# Patient Record
Sex: FEMALE | Race: WHITE | Employment: UNEMPLOYED | URBAN - METROPOLITAN AREA
[De-identification: names, ages, dates, MRNs, and addresses within clinical notes are randomized per-mention and may not be internally consistent; named-entity substitution may affect disease eponyms.]

---

## 2017-07-01 PROBLEM — M00.9 PYOGENIC ARTHRITIS OF RIGHT KNEE JOINT (HCC): Status: ACTIVE | Noted: 2017-07-01

## 2017-07-03 PROBLEM — R78.81 BACTEREMIA DUE TO PSEUDOMONAS: Status: ACTIVE | Noted: 2017-07-03

## 2017-07-03 PROBLEM — B96.5 BACTEREMIA DUE TO PSEUDOMONAS: Status: ACTIVE | Noted: 2017-07-03
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
## 2017-10-07 PROBLEM — N17.9 ACUTE RENAL FAILURE (ARF) (HCC): Status: ACTIVE | Noted: 2017-10-07

## 2017-10-09 PROBLEM — R78.81 MRSA BACTEREMIA: Status: ACTIVE | Noted: 2017-10-09

## 2017-10-09 PROBLEM — B95.62 MRSA BACTEREMIA: Status: ACTIVE | Noted: 2017-10-09

## 2017-10-18 PROBLEM — E43 SEVERE MALNUTRITION (HCC): Status: ACTIVE | Noted: 2017-10-18

## 2017-11-02 ENCOUNTER — TELEPHONE (OUTPATIENT)
Dept: INFECTIOUS DISEASES | Age: 37
End: 2017-11-02

## 2017-11-02 NOTE — TELEPHONE ENCOUNTER
Called to get labs for Pt and was advised that she D/C'd [facility] on 10/31/17 and went to hospice,ALL medications were stopped.

## 2020-11-03 PROBLEM — N17.9 ACUTE RENAL FAILURE (ARF) (HCC): Status: RESOLVED | Noted: 2017-10-07 | Resolved: 2020-11-03
